# Patient Record
Sex: FEMALE | Race: WHITE | HISPANIC OR LATINO
[De-identification: names, ages, dates, MRNs, and addresses within clinical notes are randomized per-mention and may not be internally consistent; named-entity substitution may affect disease eponyms.]

---
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
## 2025-01-14 LAB — A1CG - A1C WITH ESTIMATED AVERAGE GLUCOSE: 5.3

## 2025-04-29 ENCOUNTER — RX RENEWAL (OUTPATIENT)
Age: 20
End: 2025-04-29

## 2025-06-23 ENCOUNTER — APPOINTMENT (OUTPATIENT)
Dept: PEDIATRIC DEVELOPMENTAL SERVICES | Facility: CLINIC | Age: 20
End: 2025-06-23
Payer: COMMERCIAL

## 2025-06-23 PROCEDURE — 99214 OFFICE O/P EST MOD 30 MIN: CPT | Mod: 95

## 2025-08-19 ENCOUNTER — RX RENEWAL (OUTPATIENT)
Age: 20
End: 2025-08-19